# Patient Record
Sex: FEMALE | NOT HISPANIC OR LATINO | URBAN - METROPOLITAN AREA
[De-identification: names, ages, dates, MRNs, and addresses within clinical notes are randomized per-mention and may not be internally consistent; named-entity substitution may affect disease eponyms.]

---

## 2019-12-12 ENCOUNTER — TELEPHONE (OUTPATIENT)
Dept: PAIN MEDICINE | Facility: CLINIC | Age: 64
End: 2019-12-12

## 2019-12-12 NOTE — TELEPHONE ENCOUNTER
Lm on vm for pt to c/b  Pls do intake or schedule, as we received a referral from Dr Ghazala Hawthorne  Pls find out what her ins is and if we take it, pls get the ID# so I can verify eligibility

## 2019-12-13 NOTE — TELEPHONE ENCOUNTER
Pts daughter called back and said pt has [employer]  I explained to caller that the pt must be referred by a [facility] provider and the pt must be established with a PCP  Caller understood and will c/b when she receives the requested information

## 2024-07-03 ENCOUNTER — TELEPHONE (OUTPATIENT)
Age: 69
End: 2024-07-03

## 2024-07-03 NOTE — TELEPHONE ENCOUNTER
Pt called stating that she missed a call from [phone] however nothing documented in chart who called pt. Advised to stay by phone and await call back. Pt verbalized understanding.